# Patient Record
Sex: MALE | Race: WHITE | Employment: OTHER | URBAN - METROPOLITAN AREA
[De-identification: names, ages, dates, MRNs, and addresses within clinical notes are randomized per-mention and may not be internally consistent; named-entity substitution may affect disease eponyms.]

---

## 2017-07-27 PROBLEM — J45.909 REACTIVE AIRWAY DISEASE WITHOUT COMPLICATION: Status: ACTIVE | Noted: 2017-05-02

## 2017-09-12 ENCOUNTER — HOSPITAL ENCOUNTER (OUTPATIENT)
Dept: PHYSICAL THERAPY | Age: 77
Discharge: HOME | End: 2017-09-12
Payer: COMMERCIAL

## 2017-09-12 PROCEDURE — 97535 SELF CARE MNGMENT TRAINING: CPT

## 2017-09-12 PROCEDURE — 97162 PT EVAL MOD COMPLEX 30 MIN: CPT

## 2017-09-12 PROCEDURE — G8978 MOBILITY CURRENT STATUS: HCPCS

## 2017-09-12 PROCEDURE — G8979 MOBILITY GOAL STATUS: HCPCS

## 2017-09-12 NOTE — PROGRESS NOTES
PT NEURO/ BALANCE  EVAL AND TREATMENT    Patient Name: Puneet Parry  Date:2017  : 1940  [x]  Patient  Verified  Payor: [place] MEDICARE / Plan: [place] MEDICARE PART A & B / Product Type: Medicare /    In time:300  Out time:330  Total Treatment Time (min): 30  Total Timed Codes (min): 8  1:1 Treatment Time (MC only): 30   Visit #: 1 of 8-10    Treatment Area: Unsteadiness on feet [R26.81]    SUBJECTIVE  Pain Level (0-10 scale): (C): 0-1  (B):0  (W):  5  Any medication changes, allergies to medications, diagnosis change, or new procedure performed: see summary sheet for update  Subjective functional status/changes  CHIEF COMPLAINT: Patient reports with co balance deficits. Patient denies falls but reports being \"prone\" to falls. Hx significant for B neuropathy and reports he feels the is \"walking on a pad. \" Hx also significant for LS DDD and B THR 2016 and  (patient believes he got a recalled hip). Patient presents amb with [facility] for approx 1.5 years now.   TC on evaluation sec to patient requesting to leave by 330pm.  DEFICITS: standing unsupported, standing up for LE dressing, vacuuming, uses chair lift for stairs in the home, physical work duties, rec activities; golfing, tennis     OBJECTIVE:   Posture:   Rounded shoulders     Gait: SBQC, fwd trunk lean, R knee flexion, B LE ER, R lateral shift     ROM: WFL   Strength (MMT):  UE WNL                                           Hip L (1-5) R (1-5)   Hip Flexion 4+ 4+   Hip ext with knee flex 3+ 3+   Hip Ext 4+ 4+   Hip ABD 4- with comp 4-     Knee L (1-5) R (1-5)   Knee Flexion 5 5   Knee Extension 5 5   Ankle PF 3 3   Ankle DF 3 3   Core: bridge : fair 80% with decreased TA   Tone:WNL     Motor Control:WNL     Sensation:B feet diminished - hx neuropathy      Reflexes: NT   Balance/ Equilibrium:    Standing EO 5, EC NT    ROM EO 15 EC NT        Protective Extension:  [] Present    [x] Delayed    [] Absent    Functional Mobility      Bed Mobility:  WNL Stairs:NT   Behavior: WNL  Cognition: WNL  Optional Tests:   TUG (12 sec or less) 11 sec with SBQC     Other test /comments:    Patient Education/ Self Care: [x] Discussed POT including PT expectation, established HEP with pictures and instruction,  (minutes) : 8    Therapeutic Exercise: TC min     Modality (rationale): TC/ PD   []  E-Stim: type _ x _ min     []att   []unatt   []w/US   []w/ice   []w/heat  []  Traction: []cerv   []pelvic   _ lbs x _ min     []pro   []sup   []int   []const  []  Ultrasound: []cont   []pulse    _ W/cm2 x _  min   []1MHz   []3MHz  []  Iontophoresis: []take home patch w/ dexamethazone    []40mA   []80mA                               []_ mA min w/: []dexamethazone   []other:_  []  Ice pack _  min     [] Hot pack _  min     [] Paraffin _  min  []  Other:       Pain Level (0-10 scale) post treatment: 0-1    ASSESSMENT  [x]  See Plan of Care    PLAN  [x]  Upgrade activities as tolerated      [x] Other:_POC   2-3 x 8-10  Kym Zamora, PT 9/12/2017      Justification for Eval Code Complexity:  Patient History : age related, B THR , B neuropathy   Examination see exam   Clinical Presentation: evolving sec to multi system involvement   Clinical Decision Making : FOTO : 40 /100

## 2017-09-12 NOTE — PROGRESS NOTES
[address] [facility] [address] [facility] [address] [place], [place] [address]  Phone: [phone] Fax: [phone] / STATEMENT OF MEDICAL NECESSITY FOR PHYSICAL THERAPY SERVICES  Patient Name: Cassandra Petersen : 1940   Medical   Diagnosis: Unsteadiness on feet [R26.81] Treatment Diagnosis: Balance and gait    Onset Date: Greater than 1 year      Referral Source: Mindy Sharma MD Start of Care [facility]): 2017   Prior Hospitalization: See medical history Provider #: [id number]   Prior Level of Function: Functional I , Ind with ADLs, 100% reliant on AD    Comorbidities: Prostate CA, B THR arthritis, DDD    Medications: Verified on Patient Summary List   The Plan of Care and following information is based on the information from the initial evaluation.   ========================================================================  Assessment / key information:  Pt is a 65y.o. year old male who presents with co balance deficits starting  Over a year ago with progressive worsening. Patient denies falls but reports being \"prone\" to falls. Hx significant for B neuropathy and reports he feels the is \"walking on a pad. \" Hx also significant for LS DDD and B THR 2016 and  (patient believes he got a recalled L hip). [facility]. TC on evaluation sec to patient requesting to leave by 330pm.  Current deficits include: increased pain to 5/10 of LS and L hip at worst, decreased hip and core strength, poor gait quality : SBQC, fwd trunk lean, R knee flexion, B LE ER, R lateral shift, poor static balance: standing EO 5 secm ROM EO 15 sec, TUG 11 sec with decreased gait quality. Functional deficits include: standing unsupported, standing up for LE dressing, vacuuming, uses chair lift for stairs in the home, physical work duties, rec activities; golfing, tennis .   Home exercise program initiated on initial evaluation to address these deficits. Pt would benefit from PT to address these deficits for increased functional mobility and QOL. Hip L (1-5) R (1-5)   Hip Flexion 4+ 4+   Hip ext with knee flex 3+ 3+   Hip Ext 4+ 4+   Hip ABD 4- with comp 4-     Ankle PF 3 3   Ankle DF 3 3     ========================================================================  Eval Complexity: History: HIGH Complexity :3+ comorbidities / personal factors will impact the outcome/ POC Exam:HIGH Complexity : 4+ Standardized tests and measures addressing body structure, function, activity limitation and / or participation in recreation  Presentation: MEDIUM Complexity : Evolving with changing characteristics  Clinical Decision Making:MEDIUM Complexity : FOTO score of 26-74Overall Complexity:MEDIUM  Problem List: pain affecting function, decrease ROM, decrease strength, impaired gait/ balance, decrease ADL/ functional abilitiies, decrease activity tolerance and other FOTO 44/100   Treatment Plan may include any combination of the following: Therapeutic exercise, Therapeutic activities, Neuromuscular re-education, Physical agent/modality, Gait/balance training, Manual therapy, Aquatic therapy, Patient education, Self Care training, Functional mobility training, Home safety training and Stair training  Patient / Family readiness to learn indicated by: asking questions, trying to perform skills and interest  Persons(s) to be included in education: patient (P)  Barriers to Learning/Limitations: None  Measures taken:    Patient Goal (s): \"improve balance \"   Patient self reported health status: excellent  Rehabilitation Potential: fair   Short Term Goals: To be accomplished in  1  weeks:  1. Pt will be independent and compliant with HEP   Long Term Goals: To be accomplished in  8-10  treatments:  1. Patient will increase FOTO score to 56/100 for indications of increased functional mobility.     2.  Patient will demo ROM EO for 25 sec for improved static balance to aid in LE dressing   3. Patient will demo 4+/5 hip ABD strength without compensation for improve stability in SL stance    4. Patient will amb 48 ft without AD in clinic for progression of safety and confidence in the home without AD   Frequency / Duration:   Patient to be seen  2-3  times per week for 8-10  treatments:  Patient / Caregiver education and instruction: self care, activity modification and exercises  G-Codes (GP): Mobility: [id number] Current  CK= 40-59%   Goal  CK= 40-59%. The severity rating is based on the FOTO Score  Therapist Signature: Terrence Serrato PT Date: 1/46/8846   Certification Period: 9/12/17-12/10/17 Time: 342pm   ========================================================================  I certify that the above Physical Therapy Services are being furnished while the patient is under my care. I agree with the treatment plan and certify that this therapy is necessary. Physician Signature:        Date:       Time:   Please sign and return to [facility] or you may fax the signed copy to [phone]. Thank you.

## 2017-09-14 ENCOUNTER — HOSPITAL ENCOUNTER (OUTPATIENT)
Dept: PHYSICAL THERAPY | Age: 77
Discharge: HOME | End: 2017-09-14
Payer: COMMERCIAL

## 2017-09-14 PROCEDURE — 97110 THERAPEUTIC EXERCISES: CPT

## 2017-09-14 PROCEDURE — 97112 NEUROMUSCULAR REEDUCATION: CPT

## 2017-09-14 NOTE — PROGRESS NOTES
PT DAILY TREATMENT NOTE     Patient Name: Adarsh Bhakta  Date:2017  : 1940  [x]  Patient  Verified  Payor: Cristina Benavides / Plan: [place] MEDICARE PART A & B / Product Type: Medicare /    In time: 8:01am      Out time: 8:52am  Total Treatment Time (min): 51  Total Timed Codes (min): 51  1:1 Treatment Time (min): 45  Visit #: 2 of 8-10    Treatment Area: Unsteadiness on feet [R26.81]    SUBJECTIVE  Pain Level (0-10 scale): 0-1 in L/S  Any medication changes, allergies to medications, adverse drug reactions, diagnosis change, or new procedure performed?: [x] No    [] Yes (see summary sheet for update)  Subjective functional status/changes:   [] No changes reported  \"I need to be better with my exercises. \"    OBJECTIVE  Modality rationale: NT   Min Type Additional Details    [] Estim: []Att   []Unatt        []TENS instruct                  []IFC  []Premod   []NMES                     []Other:  []w/US   []w/ice   []w/heat  Position:  Location:    []  Traction: [] Cervical       []Lumbar                       [] Prone          []Supine                       []Intermittent   []Continuous Lbs:  [] before manual  [] after manual    []  Ultrasound: []Continuous   [] Pulsed                           []1MHz   []3MHz Location:  W/cm2:    []  Iontophoresis with dexamethasone         Location: [] Take home patch   [] In clinic    []  Ice     []  heat  []  Ice massage Position:  Location:    []  Vasopneumatic Device Pressure:       [] lo [] med [] hi   Temperature: [] lo [] med [] hi   [] Skin assessment post-treatment:  []intact []redness- no adverse reaction       []redness  adverse reaction:     24 min Therapeutic Exercise:  [x] See flow sheet: initiated therex per IE   Rationale: increase ROM and increase strength to improve the patients ability to ambulate with a normalized gait pattern     23 min Neuromuscular Re-education:  [x]  See flow sheet:    Rationale: increase strength, improve coordination, improve balance and increase proprioception  to improve the patients ability to self-correct for losses of balance while amb in community    4 min Gait Trainin feet x 2 without AD and using gait belt on level surfaces with SBA level of assist   Rationale: improve dynamic balance and stability to improve patient's ability to amb with reduced fall risk          X min Patient Education: [x] Review HEP from IE     Other Objective/Functional Measures:     Pain Level (0-10 scale) post treatment: 0-1    ASSESSMENT/Changes in Function:   Patient has hx of (B) THR and notes pain/discomfort with (L) hip flexion SLR, but able to complete without residual inc in pain. Patient able to amb (I) using gait belt; mild (R) medial whip with reduced saul, but no LOB. Patient will continue to benefit from skilled PT services to modify and progress therapeutic interventions, address functional mobility deficits, address ROM deficits, address strength deficits, analyze and address soft tissue restrictions, analyze and cue movement patterns, analyze and modify body mechanics/ergonomics, assess and modify postural abnormalities and address imbalance/dizziness to attain remaining goals. [x]  See Plan of Care  []  See progress note/recertification  []  See Discharge Summary         Progress towards goals / Updated goals:  Short Term Goals: To be accomplished in  1  weeks:  1. Pt will be independent and compliant with HEP. -Goal progressing; pt expressed intent to inc compliance after treatment today (17)  Long Term Goals: To be accomplished in  8-10  treatments:  1. Patient will increase FOTO score to 56/100 for indications of increased functional mobility. 2.  Patient will demo ROM EO for 25 sec for improved static balance to aid in LE dressing   3. Patient will demo 4+/5 hip ABD strength without compensation for improve stability in SL stance    4.   Patient will amb 50 ft without AD in clinic for progression of safety and confidence in the home without AD     PLAN  [x]  Upgrade activities as tolerated     [x]  Continue plan of care  []  Update interventions per flow sheet       []  Discharge due to:_  [x]  Other: assess response to treatment      Jabari Aldana, JENI 9/14/2017

## 2017-09-18 ENCOUNTER — HOSPITAL ENCOUNTER (OUTPATIENT)
Dept: PHYSICAL THERAPY | Age: 77
Discharge: HOME | End: 2017-09-18
Payer: COMMERCIAL

## 2017-09-18 PROCEDURE — 97110 THERAPEUTIC EXERCISES: CPT

## 2017-09-18 PROCEDURE — 97112 NEUROMUSCULAR REEDUCATION: CPT

## 2017-09-18 PROCEDURE — 97116 GAIT TRAINING THERAPY: CPT

## 2017-09-18 NOTE — PROGRESS NOTES
PT DAILY TREATMENT NOTE     Patient Name: Javier Offer  Date:2017  : 1940  [x]  Patient  Verified  Payor: BLUE CROSS / Plan: [employer] [facility] / Product Type: PPO /    In time:8:00  Out time:8:50  Total Treatment Time (min): 50  Total Timed Codes (min): 50  1:1 Treatment Time (min): 50   Visit #: 3 of 8-10    Treatment Area: Unsteadiness on feet [R26.81]    SUBJECTIVE  Pain Level (0-10 scale): 0-1 in the lumbar spine  Any medication changes, allergies to medications, adverse drug reactions, diagnosis change, or new procedure performed?: [x] No    [] Yes (see summary sheet for update)  Subjective functional status/changes:   [] No changes reported  Pt notes no falls or LOB recently. L hip is worse than the R     Pt ambulates into clinic with [facility] with significant R trunk lean     OBJECTIVE      17 min Therapeutic Exercise:  [x] See flow sheet : initiate hip abd and add in HL, bridging;    Rationale: increase ROM and increase strength to improve the patients ability to improve gait, mobility and safety      25 min Neuromuscular Re-education:  [x]  See flow sheet : initiate MSR at great toe with feet 4\" apart. Rationale: improve coordination, improve balance and increase proprioception  to improve the patients ability to improve mobility in home and community to prevent falls and increase safety     8 min Gait Training:  2x40' with no AD on level surfaces. Pt demo's upright posturing, wide AJIT, toe out on R, increased knee flexion during stance on R. Lowered cane and VC to achieve erect posturing. Pt able to correct approx 50% posture with cane adjustement and notes \"that does feel better\".     Rationale: improve dynamic balance and stability to decrease fall risk           x min Patient Education: [x] Review HEP    [] Progressed/Changed HEP based on:   [] positioning   [] body mechanics   [] transfers   [] heat/ice application        Other Objective/Functional Measures:   Rom EO: Pt can stabilize I approx 5\" throughout the total duration of 30\" requiring UE Assist and numerous squats to drop his COG lower to improve stability. Pt jose's posterior weight shifting during balance       Pain Level (0-10 scale) post treatment: 0-1/10     ASSESSMENT/Changes in Function: Pt notes no knee pain and jose's sufficient quad strength to utilize this squatting strategy for balance, however this will fail him if he develops quad weaknes. Will con't to work on other balance strategies. Pt notes \"bridges feel great\". Patient will continue to benefit from skilled PT services to modify and progress therapeutic interventions, address functional mobility deficits, address ROM deficits, address strength deficits, analyze and address soft tissue restrictions, analyze and cue movement patterns, analyze and modify body mechanics/ergonomics, assess and modify postural abnormalities, address imbalance/dizziness and instruct in home and community integration to attain remaining goals. []  See Plan of Care  []  See progress note/recertification  []  See Discharge Summary         Progress towards goals / Updated goals:  Short Term Goals: To be accomplished in  1  weeks:  1.  Pt will be independent and compliant with HEP. -Goal progressing; pt expressed improved compliance (9/18/17)  Long Term Goals: To be accomplished in  8-10  treatments:  1.  Patient will increase FOTO score to 56/100 for indications of increased functional mobility.    2.  Patient will demo ROM EO for 25 sec for improved static balance to aid in LE dressing Goal progressing slowly, approx 5\" before LOB (9/18/17)  3.   Patient will demo 4+/5 hip ABD strength without compensation for improve stability in SL stance  Progressed tband hip and mat training (9/18/17)   4.  Patient will amb 50 ft without AD in clinic for progression of safety and confidence in the home without AD  Pt  Progressing with I ambulation with sbA 2x40' today (9/18/17)  PLAN  [x]  Upgrade activities as tolerated     []  Continue plan of care  []  Update interventions per flow sheet       []  Discharge due to:_  [x]  Other:_  Assess changes to cane height and con't gait training with SPC to achieve neutral trunk posture and also gait without AD    Jesus Petit, PT 9/18/2017  7:30 AM

## 2017-09-20 ENCOUNTER — HOSPITAL ENCOUNTER (OUTPATIENT)
Dept: PHYSICAL THERAPY | Age: 77
Discharge: HOME | End: 2017-09-20
Payer: COMMERCIAL

## 2017-09-20 PROCEDURE — 97110 THERAPEUTIC EXERCISES: CPT | Performed by: PHYSICAL THERAPIST

## 2017-09-20 PROCEDURE — 97112 NEUROMUSCULAR REEDUCATION: CPT | Performed by: PHYSICAL THERAPIST

## 2017-09-20 PROCEDURE — 97140 MANUAL THERAPY 1/> REGIONS: CPT | Performed by: PHYSICAL THERAPIST

## 2017-09-20 NOTE — PROGRESS NOTES
PT DAILY TREATMENT NOTE     Patient Name: Brigida Epstein  Date:2017  : 1940  [x]  Patient  Verified  Payor: VA MEDICARE / Plan: VA MEDICARE PART A & B / Product Type: Medicare /    In time:803am  Out time:858  Total Treatment Time (min):55  Total Timed Codes (min): 50  1:1 Treatment Time (min): 40  Visit #: 4 of 8-10    Treatment Area: Unsteadiness on feet [R26.81]    SUBJECTIVE  Pain Level (0-10 scale): 0-1 in the lumbar spine  Any medication changes, allergies to medications, adverse drug reactions, diagnosis change, or new procedure performed?: [x] No    [] Yes (see summary sheet for update)  Subjective functional status/changes:   [] No changes reported    Pt notes no  falls or LOB recently. Pt ambulates into clinic   with [facility] with significant R trunk lean     OBJECTIVE      47/32 min Therapeutic Exercise:  [x] See flow sheet : initiate hip abd and add in HL, bridging;    Rationale: increase ROM and increase strength to improve the patients ability to improve gait, mobility and safety      8 min Neuromuscular Re-education:  [x]  See flow sheet : initiate MSR at great toe with feet 4\" apart. Rationale: improve coordination, improve balance and increase proprioception  to improve the patients ability to improve mobility in home and community to prevent falls and increase safety     TC min Gait Training:  2x40' with no AD on level surfaces. Pt demo's upright posturing, wide AJIT, toe out on R, increased knee flexion during stance on R.       Rationale: improve dynamic balance and stability to decrease fall risk           x min Patient Education: [x] Review HEP    [] Progressed/Changed HEP based on:   [] positioning   [] body mechanics   [] transfers   [] heat/ice application        Other Objective/Functional Measures:   Rom EO: Pt can stabilize I approx 5-6 sec throughout the 30 sec trial  Cane height seems better after adjustment per patient report   Pt demo's posterior weight shifting LOB during balance   Added mini squats with able to perform 10 reps with good form, increased balance noted with sit to stand with VC for fwd COG. Added wall peels with lifting pelvis off wall followed by shoulder blades with moderate challenge    Pain Level (0-10 scale) post treatment: 0/10     ASSESSMENT/Changes in Function: . Will con't to work on other balance strategies. Pt to added wall peels to HEP for negotiating post LOB     Patient will continue to benefit from skilled PT services to modify and progress therapeutic interventions, address functional mobility deficits, address ROM deficits, address strength deficits, analyze and address soft tissue restrictions, analyze and cue movement patterns, analyze and modify body mechanics/ergonomics, assess and modify postural abnormalities, address imbalance/dizziness and instruct in home and community integration to attain remaining goals. []  See Plan of Care  []  See progress note/recertification  []  See Discharge Summary         Progress towards goals / Updated goals:  Short Term Goals: To be accomplished in  1  weeks:  1.  Pt will be independent and compliant with HEP. -Goal progressing; pt expressed improved compliance (9/18/17)  Long Term Goals: To be accomplished in  8-10  treatments:  1.  Patient will increase FOTO score to 56/100 for indications of increased functional mobility.    2.  Patient will demo ROM EO for 25 sec for improved static balance to aid in LE dressing Goal progressing slowly, approx 5\" before LOB (9/18/17)  3.   Patient will demo 4+/5 hip ABD strength without compensation for improve stability in SL stance  Progressed tband hip and mat training (9/18/17)   4.  Patient will amb 50 ft without AD in clinic for progression of safety and confidence in the home without AD  Pt  Progressing with I ambulation with sbA 2x40' today (9/18/17)  PLAN  [x]  Upgrade activities as tolerated     []  Continue plan of care  []  Update interventions per flow sheet       []  Discharge due to:_  []  Other:_      Robb Meyer, PT 9/20/2017  7:30 AM

## 2017-09-26 ENCOUNTER — APPOINTMENT (OUTPATIENT)
Dept: PHYSICAL THERAPY | Age: 77
End: 2017-09-26
Payer: COMMERCIAL

## 2017-09-28 ENCOUNTER — HOSPITAL ENCOUNTER (OUTPATIENT)
Dept: PHYSICAL THERAPY | Age: 77
Discharge: HOME | End: 2017-09-28
Payer: COMMERCIAL

## 2017-09-28 PROCEDURE — 97112 NEUROMUSCULAR REEDUCATION: CPT

## 2017-09-28 PROCEDURE — 97110 THERAPEUTIC EXERCISES: CPT

## 2017-09-28 NOTE — PROGRESS NOTES
PT DAILY TREATMENT NOTE     Patient Name: Kira Rosenbaum  Date:2017  : 1940  [x]  Patient  Verified  Payor: Janina Vickers / Plan: [place] [place] [facility] [place] / Product Type: Medicare /    In time: 8:02am  Out time: 9:02am  Total Treatment Time (min): 60  Total Timed Codes (min): 60  1:1 Treatment Time (min): 43  Visit #: 5 of 8-10    Treatment Area: Unsteadiness on feet [R26.81]    SUBJECTIVE  Pain Level (0-10 scale): 0-1 in the lumbar spine  Any medication changes, allergies to medications, adverse drug reactions, diagnosis change, or new procedure performed?: [x] No    [] Yes (see summary sheet for update)  Subjective functional status/changes:   [] No changes reported  \"I feel like I've been doing fine. I know it's a strength issue, because I can't go up the stairs like normal.\"    OBJECTIVE  45 min Therapeutic Exercise:  [x] See flow sheet:   Rationale: increase ROM and increase strength to improve the patients ability to improve gait, mobility and safety      15 min Neuromuscular Re-education:  [x]  See flow sheet:   Rationale: improve coordination, improve balance and increase proprioception  to improve the patients ability to improve mobility in home and community to prevent falls and increase safety     TC min Gait Training:  2x40' with no AD on level surfaces. Pt demo's upright posturing, wide AJIT, toe out on R, increased knee flexion during stance on R. Rationale: improve dynamic balance and stability to decrease fall risk           X min Patient Education: [x] Review HEP    [] Progressed/Changed HEP based on:   [] positioning   [] body mechanics   [] transfers   [] heat/ice application        Other Objective/Functional Measures:        Pain Level (0-10 scale) post treatment: 0    ASSESSMENT/Changes in Function:   Good tolerance to treatment today. Cont'd squatting to inc stability with balance during ball toss, but able to adjust and modify upon cueing for GS to aid with hip strategy. Noted 3 LOB during ball toss with ability to self-correct x 1 and req min (A) x 2. Patient will continue to benefit from skilled PT services to modify and progress therapeutic interventions, address functional mobility deficits, address ROM deficits, address strength deficits, analyze and address soft tissue restrictions, analyze and cue movement patterns, analyze and modify body mechanics/ergonomics, assess and modify postural abnormalities, address imbalance/dizziness and instruct in home and community integration to attain remaining goals. [x]  See Plan of Care  []  See progress note/recertification  []  See Discharge Summary         Progress towards goals / Updated goals:   Short Term Goals: To be accomplished in  1  weeks:  1.  Pt will be independent and compliant with HEP. -Goal progressing; pt expressed improved compliance (9/18/17)   Long Term Goals: To be accomplished in  8-10  treatments:  1.  Patient will increase FOTO score to 56/100 for indications of increased functional mobility.    2.  Patient will demo ROM EO for 25 sec for improved static balance to aid in LE dressing Goal progressing slowly, approx 5\" before LOB (9/18/17)  3. Patient will demo 4+/5 hip ABD strength without compensation for improve stability in SL stance  -Goal progressing; improved tolerance to clams and ABD SLR today (9/28/17)  4.   Patient will amb 50 ft without AD in clinic for progression of safety and confidence in the home without AD  Pt  Progressing with I ambulation with sbA 2x40' today (9/18/17)    PLAN  [x]  Upgrade activities as tolerated     [x]  Continue plan of care  []  Update interventions per flow sheet       []  Discharge due to:_  []  Other:_      Michele Howell, PTA  9/28/2017

## 2017-10-03 ENCOUNTER — HOSPITAL ENCOUNTER (OUTPATIENT)
Dept: PHYSICAL THERAPY | Age: 77
Discharge: HOME | End: 2017-10-03
Payer: COMMERCIAL

## 2017-10-03 PROCEDURE — 97112 NEUROMUSCULAR REEDUCATION: CPT

## 2017-10-03 PROCEDURE — 97110 THERAPEUTIC EXERCISES: CPT

## 2017-10-03 PROCEDURE — 97116 GAIT TRAINING THERAPY: CPT

## 2017-10-03 NOTE — PROGRESS NOTES
PT DAILY TREATMENT NOTE     Patient Name: Bryan Rancho  Date:10/3/2017  : 1940  [x]  Patient  Verified  Payor: BLUE CROSS / Plan: [employer] [facility] / Product Type: PPO /    In time: 8:03am  Out time: 8:53am  Total Treatment Time (min): 50  Total Timed Codes (min): 50  1:1 Treatment Time (min): 39  Visit #: 6 of 8-10    Treatment Area: Unsteadiness on feet [R26.81]    SUBJECTIVE  Pain Level (0-10 scale): 0-1  Any medication changes, allergies to medications, adverse drug reactions, diagnosis change, or new procedure performed?: [x] No    [] Yes (see summary sheet for update)  Subjective functional status/changes:   [] No changes reported  \"I'm starting to see the improvements. \"    OBJECTIVE  28 min Therapeutic Exercise:  [x] See flow sheet:    Rationale: increase ROM and increase strength to improve the patients ability to improve gait, mobility and safety      14 min Neuromuscular Re-education:  [x]  See flow sheet:   Rationale: improve coordination, improve balance and increase proprioception  to improve the patients ability to improve mobility in home and community to prevent falls and increase safety     8 min Gait Training:  2 x 60 feet with no AD on level surfaces req SBA, pt initially hesitant and saul is slow     Rationale: improve dynamic balance and stability to decrease fall risk           X min Patient Education: [x] Review HEP    [] Progressed/Changed HEP based on:   [] positioning   [] body mechanics   [] transfers   [] heat/ice application        Other Objective/Functional Measures:    ROM EO: 12 seconds without excessive knee flexion     Pain Level (0-10 scale) post treatment: 0    ASSESSMENT/Changes in Function:   Improved stability with gait, pt able to amb (I) 2 x 60 feet without LOB. Balance strategies slowly improving as pt able to stand ROM EO 12s before flexing the knees for inc stability. Gait is slow, but steady without AD.      Patient will continue to benefit from skilled PT services to modify and progress therapeutic interventions, address functional mobility deficits, address ROM deficits, address strength deficits, analyze and address soft tissue restrictions, analyze and cue movement patterns, analyze and modify body mechanics/ergonomics, assess and modify postural abnormalities, address imbalance/dizziness and instruct in home and community integration to attain remaining goals. [x]  See Plan of Care  []  See progress note/recertification  []  See Discharge Summary         Progress towards goals / Updated goals:   Short Term Goals: To be accomplished in  1  weeks:  1.  Pt will be independent and compliant with HEP. -Goal progressing; pt expressed improved compliance (9/18/17)   Long Term Goals: To be accomplished in  8-10  treatments:  1.  Patient will increase FOTO score to 56/100 for indications of increased functional mobility.    2.  Patient will demo ROM EO for 25 sec for improved static balance to aid in LE dressing Goal progressing slowly, approx 12\" before LOB (10/3/17)  3. Patient will demo 4+/5 hip ABD strength without compensation for improve stability in SL stance  -Goal progressing; improved tolerance to clams and ABD SLR today (9/28/17)  4.   Patient will amb 50 ft without AD in clinic for progression of safety and confidence in the home without AD  Pt  Progressing with I ambulation with sbA 2x40' today (9/18/17)    PLAN  [x]  Upgrade activities as tolerated     [x]  Continue plan of care  []  Update interventions per flow sheet       []  Discharge due to:_  []  Other:_      Maday Archer, PTA  10/3/2017

## 2017-10-05 ENCOUNTER — APPOINTMENT (OUTPATIENT)
Dept: PHYSICAL THERAPY | Age: 77
End: 2017-10-05
Payer: COMMERCIAL

## 2017-10-05 NOTE — PROGRESS NOTES
PT DAILY TREATMENT NOTE     Patient Name: Tracie Ramesh  Date:10/5/2017  : 1940  [x]  Patient  Verified  Payor: [place] MEDICARE / Plan: [place] MEDICARE PART A & B / Product Type: Medicare /    In time:8:03  Out time:9:03  Total Treatment Time (min): 60  Total Timed Codes (min): 60  1:1 Treatment Time (min): 8:03 to 8:39 (36)   Visit #: 7 of 8-10    Treatment Area: Unsteadiness on feet [R26.81]    SUBJECTIVE  Pain Level (0-10 scale): 0-1 in the L/S   Any medication changes, allergies to medications, adverse drug reactions, diagnosis change, or new procedure performed?: [x] No    [] Yes (see summary sheet for update)  Subjective functional status/changes:   [] No changes reported  The balance is still tough. Distance in walking is stil hard. I can do what is necessary but it's difficult at times. Not sure the balance is getting any better. I think it has to do with my neuropathy; feel like i'm walking on a pad. It's not painful but it's uncomfortable. Near falls in home: rarely due to reliable use of SPC in home most of the time. In -home ambulation: can ambulate without SPC if needed but prefers not to     OBJECTIVE      25 min Therapeutic Exercise:  [x] See flow sheet : initiate mod planks prone and lateral    Rationale: increase ROM, increase strength and improve coordination to improve the patients ability to improve gait, strength for mobility      25 min Neuromuscular Re-education:  [x]  See flow sheet :   Rationale: improve coordination, improve balance and increase proprioception  to improve the patients ability to improve gait, safety with in-home mobility     10 min Gait Training:  ___ feet with ___ device on level surfaces with ___ level of assist   Rationale: I gait in clinic with sbAx1. Pt demo's min improved speed but still co'nt with (L) Trendelenburg. Gait training with SPC in the L UE improves trunk posturing for gait.              x min Patient Education: [x] Review HEP    [x] Progressed/Changed HEP based on:  glute med strength, quad strength, UE strength to assist with transfers. Hip flexor strength to assist with ascending stairs. Advised pt to meet with  at the gym to discuss strengthening routine as recommended by PT    [] positioning   [] body mechanics   [] transfers   [] heat/ice application        Other Objective/Functional Measures:   MMT L hip abd 4/5 with compensations  See gait assessment above    Pain Level (0-10 scale) post treatment: 0    ASSESSMENT/Changes in Function: Pt presents with (L) trendelenburg, made better with changing SPC to the (L) hand and addressed with HEP changes today (although pt with chronic weakness since L THR 8 years ago). Challenged by upright posturing in gait. Pt demo's appros 50% sit to stand T/F independently with no imbalance upon standing     Patient will continue to benefit from skilled PT services to modify and progress therapeutic interventions, address functional mobility deficits, address ROM deficits, address strength deficits, analyze and address soft tissue restrictions, analyze and cue movement patterns, analyze and modify body mechanics/ergonomics, assess and modify postural abnormalities, address imbalance/dizziness and instruct in home and community integration to attain remaining goals. []  See Plan of Care  []  See progress note/recertification  []  See Discharge Summary         Progress towards goals / Updated goals:              Short Term Goals: To be accomplished in  1  weeks:  1.  Pt will be independent and compliant with HEP.  -Goal progressing; pt expressed improved compliance (9/18/17)                         Long Term Goals: To be accomplished in  8-10  treatments:  1.  Patient will increase FOTO score to 56/100 for indications of increased functional mobility.    2.  Patient will demo ROM EO for 25 sec for improved static balance to aid in LE dressing Goal progressing slowly, approx 12\" before LOB (10/3/17)  3.  Patient will demo 4+/5 hip ABD strength without compensation for improve stability in SL stance  -Goal progressing; 4/5 with compensations (10/6/17)  4.  Patient will amb 50 ft without AD in clinic for progression of safety and confidence in the home without AD  Pt  Progressing with I ambulation with sbA 2x60' today; improved speed but con't with L trendelenburg (10/6/17)    PLAN  [x]  Upgrade activities as tolerated     []  Continue plan of care  []  Update interventions per flow sheet       []  Discharge due to:_  [x]  Other:assess gait with SPC changes to (L) UE; con't hip flexion and abd strength       Olamide Starr, PT 10/5/2017  7:13 PM

## 2017-10-06 ENCOUNTER — HOSPITAL ENCOUNTER (OUTPATIENT)
Dept: PHYSICAL THERAPY | Age: 77
Discharge: HOME | End: 2017-10-06
Payer: COMMERCIAL

## 2017-10-06 PROCEDURE — 97112 NEUROMUSCULAR REEDUCATION: CPT

## 2017-10-06 PROCEDURE — 97116 GAIT TRAINING THERAPY: CPT

## 2017-10-10 ENCOUNTER — APPOINTMENT (OUTPATIENT)
Dept: PHYSICAL THERAPY | Age: 77
End: 2017-10-10
Payer: COMMERCIAL

## 2017-10-11 ENCOUNTER — HOSPITAL ENCOUNTER (OUTPATIENT)
Dept: PHYSICAL THERAPY | Age: 77
Discharge: HOME | End: 2017-10-11
Payer: COMMERCIAL

## 2017-10-11 PROCEDURE — 97112 NEUROMUSCULAR REEDUCATION: CPT

## 2017-10-11 PROCEDURE — G8979 MOBILITY GOAL STATUS: HCPCS

## 2017-10-11 PROCEDURE — G8980 MOBILITY D/C STATUS: HCPCS

## 2017-10-11 PROCEDURE — 97110 THERAPEUTIC EXERCISES: CPT

## 2017-10-11 NOTE — PROGRESS NOTES
PT DAILY TREATMENT NOTE     Patient Name: Christina Rowland  Date:10/11/2017  : 1940  [x]  Patient  Verified  Payor: BLUE CROSS / Plan: [employer] [facility] / Product Type: PPO /    In time: 4:05pm       Out time: 5:15pm  Total Treatment Time (min): 70  Total Timed Codes (min): 70  1:1 Treatment Time (min): 30  Visit #: 8 of 8-10    Treatment Area: Unsteadiness on feet [R26.81]    SUBJECTIVE  Pain Level (0-10 scale): 0-1  Any medication changes, allergies to medications, adverse drug reactions, diagnosis change, or new procedure performed?: [x] No    [] Yes (see summary sheet for update)  Subjective functional status/changes:   [] No changes reported  \"I feel like I have the tools I need. My pain has gotten better since I started coming here, but not so sure about the balance. \"    OBJECTIVE  45 min Therapeutic Exercise:  [x] See flow sheet: assisted pt with FOTO completion   Rationale: increase ROM, increase strength and improve coordination to improve the patients ability to improve gait, strength for mobility      22 min Neuromuscular Re-education:  [x]  See flow sheet: assessed goals for PN   Rationale: improve coordination, improve balance and increase proprioception  to improve the patients ability to improve gait, safety with in-home mobility     3 min Gait Trainin feet using (L) SPC and VCs for sequencing   Rationale: improve gait quality to improve patient's ability to amb with reduced fall risk          X min Patient Education: [x] Review HEP     Other Objective/Functional Measures:   Improvements: pain levels, quality of sit<>stands  FOTO score: 45/100 (at last assessment, 44/100)  Hip strength: abd 4/5 with compensations  Core strength: 100% bridge   ROM EO: 20 seconds before LOB  Pain: (A) 0-1    Patient comfortable with D/C and notes PT has provided the tools and confidence to continue progress per HEP.     Pain Level (0-10 scale) post treatment: 0    ASSESSMENT/Changes in Function: See D/C. Patient will continue to benefit from skilled PT services to modify and progress therapeutic interventions, address functional mobility deficits, address ROM deficits, address strength deficits, analyze and address soft tissue restrictions, analyze and cue movement patterns, analyze and modify body mechanics/ergonomics, assess and modify postural abnormalities, address imbalance/dizziness and instruct in home and community integration to attain remaining goals. [x]  See Discharge Summary         Progress towards goals / Updated goals:              Short Term Goals: To be accomplished in  1  weeks:  1.  Pt will be independent and compliant with HEP.  -Goal progressing; pt expressed improved compliance (9/18/17)   Long Term Goals: To be accomplished in  8-10  treatments:  1.  Patient will increase FOTO score to 56/100 for indications of increased functional mobility.  -Goal progressing; min inc from 44/100 to 45/100  2.  Patient will demo ROM EO for 25 sec for improved static balance to aid in LE dressing -Goal near met, 20 sec before LOB (15 sec at IE)  3.  Patient will demo 4+/5 hip ABD strength without compensation for improve stability in SL stance  -Goal progressing; 4/5 with compensations (4-/5 at IE)  4.  Patient will amb 50 ft without AD in clinic for progression of safety and confidence in the home without AD  -Goal met; pt able to amb (I) with sbA 2 x 60 feet with improved speed but con't with (L) trendelenburg    PLAN       [x]  Discharge due to: pt request and comfort with continuing with HEP to progress balance    Tl Or, PTA  10/11/2017

## 2017-10-16 NOTE — PROGRESS NOTES
[address] [facility] [address]     Phone: [phone] Fax: [phone] SUMMARY  Patient Name: Dee Enciso : 1940   Treatment/Medical Diagnosis: Unsteadiness on feet [R26.81]   Referral Source: Stacy Agudelo MD     Date of Initial Visit: 17 Attended Visits: 8 Missed Visits: 0     SUMMARY OF TREATMENT  Pt is a 65y.o. year old male who presents with co balance deficits starting  Over a year ago with progressive worsening.  Patient denies falls but reports being \"prone\" to falls.  Hx significant for B neuropathy and reports he feels the is \"walking on a pad. \" Hx also significant for LS DDD and B THR 2016 and  (patient believes he got a recalled L hip).  Treatment has consisted of the following: Therapeutic exercise, Therapeutic activities, Neuromuscular re-education, Physical agent/modality, Gait/balance training, Manual therapy,  Patient education, Self Care training, Functional mobility training, Home safety training and Stair training. CURRENT STATUS  Patient feels that PT has given him the confidence and tools to improve balance. Hip strength has improved and pt states he has less back pain since IE. Neuropathy persists with pt cont'ing to note perceptions of \"walking on a pad\" but patient notes inc hip stability has improved safety while amb at home.      Goal/Measure of Progress:  1.  Patient will increase FOTO score to 56/100 for indications of increased functional mobility.  -Goal progressing; min inc from 44/100 to 45/100  2.  Patient will demo ROM EO for 25 sec for improved static balance to aid in LE dressing -Goal near met, 20 sec before LOB (15 sec at IE)  3.  Patient will demo 4+/5 hip ABD strength without compensation for improve stability in SL stance  -Goal progressing; 4/5 with compensations (4-/5 at IE)  4.  Patient will amb 50 ft without AD in clinic for progression of safety and confidence in the home without AD  -Goal met; pt able to amb (I) with sbA 2 x 60 feet with improved speed but con't with (L) trendelenburg    Mobility:   Goal  CK= 40-59%  D/C  CK= 40-59%. The severity rating is based on the FOTO Score    RECOMMENDATIONS  Discontinue therapy. Progressing towards or have reached established goals. If you have any questions/comments please contact us directly at [phone]. Thank you for allowing us to assist in the care of your patient.     LPTA Signature: David Novak Date: 10/11/2017   Therapist Signature: Didi Hou DPT Time: 2:54 PM   Reporting Period:  9/12/17-10/11/17

## 2018-01-25 PROBLEM — R26.89 BALANCE PROBLEMS: Status: ACTIVE | Noted: 2017-08-28

## 2018-01-25 PROBLEM — J44.9 CHRONIC OBSTRUCTIVE PULMONARY DISEASE (HCC): Status: ACTIVE | Noted: 2017-08-28

## 2020-03-22 PROBLEM — R93.1 DECREASED CARDIAC EJECTION FRACTION: Status: ACTIVE | Noted: 2019-10-25

## 2020-03-22 PROBLEM — I48.19 PERSISTENT ATRIAL FIBRILLATION (HCC): Status: ACTIVE | Noted: 2019-10-25

## 2021-03-11 PROBLEM — E04.1 LEFT THYROID NODULE: Status: ACTIVE | Noted: 2020-07-19

## 2021-03-11 PROBLEM — Z79.01 ANTICOAGULATED: Status: ACTIVE | Noted: 2020-09-17

## 2023-01-26 ENCOUNTER — PREPPED CHART (OUTPATIENT)
Facility: LOCATION | Age: 83
End: 2023-01-26

## 2023-01-26 ASSESSMENT — VISUAL ACUITY
OS_CC: 20/70
OS_CC: 20/20-2
OU_CC: 20/20
OD_CC: 20/20
OU_CC: 20/20-2
OD_CC: 20/200

## 2023-01-26 ASSESSMENT — TONOMETRY
OS_IOP_MMHG: 11
OD_IOP_MMHG: 11

## 2023-09-18 ENCOUNTER — ESTABLISHED PATIENT (OUTPATIENT)
Facility: LOCATION | Age: 83
End: 2023-09-18

## 2023-09-18 DIAGNOSIS — H40.1131: ICD-10-CM

## 2023-09-18 PROCEDURE — 92133 CPTRZD OPH DX IMG PST SGM ON: CPT

## 2023-09-18 PROCEDURE — 99213 OFFICE O/P EST LOW 20 MIN: CPT

## 2023-09-18 ASSESSMENT — VISUAL ACUITY
OU_SC: 20/20-1
OS_SC: 20/20
OD_SC: 20/20
OU_SC: 20/20
OS_SC: 20/70
OD_SC: 20/200

## 2023-09-18 ASSESSMENT — TONOMETRY
OD_IOP_MMHG: 9
OS_IOP_MMHG: 9

## 2024-04-01 ENCOUNTER — COMPREHENSIVE EXAM (OUTPATIENT)
Facility: LOCATION | Age: 84
End: 2024-04-01

## 2024-04-01 DIAGNOSIS — H40.1131: ICD-10-CM

## 2024-04-01 PROCEDURE — 92015 DETERMINE REFRACTIVE STATE: CPT

## 2024-04-01 PROCEDURE — 92133 CPTRZD OPH DX IMG PST SGM ON: CPT

## 2024-04-01 PROCEDURE — 99214 OFFICE O/P EST MOD 30 MIN: CPT

## 2024-04-01 ASSESSMENT — VISUAL ACUITY
OU_SC: 20/20
OS_SC: 20/20
OD_SC: 20/40
OU_SC: 20/40
OS_SC: 20/70

## 2024-04-01 ASSESSMENT — KERATOMETRY
OS_AXISANGLE_DEGREES: 120
OD_AXISANGLE2_DEGREES: 2
OS_AXISANGLE2_DEGREES: 30
OD_AXISANGLE_DEGREES: 92
OS_K1POWER_DIOPTERS: 44.25
OS_K2POWER_DIOPTERS: 44.75
OD_K1POWER_DIOPTERS: 44.00
OD_K2POWER_DIOPTERS: 44.50

## 2024-04-01 ASSESSMENT — TONOMETRY
OD_IOP_MMHG: 10
OS_IOP_MMHG: 10

## 2024-09-30 ENCOUNTER — FOLLOW UP (OUTPATIENT)
Facility: LOCATION | Age: 84
End: 2024-09-30

## 2024-09-30 DIAGNOSIS — H40.013: ICD-10-CM

## 2024-09-30 PROCEDURE — 92083 EXTENDED VISUAL FIELD XM: CPT

## 2024-09-30 PROCEDURE — 99213 OFFICE O/P EST LOW 20 MIN: CPT

## 2024-09-30 PROCEDURE — 92133 CPTRZD OPH DX IMG PST SGM ON: CPT
